# Patient Record
Sex: FEMALE | Race: BLACK OR AFRICAN AMERICAN | URBAN - METROPOLITAN AREA
[De-identification: names, ages, dates, MRNs, and addresses within clinical notes are randomized per-mention and may not be internally consistent; named-entity substitution may affect disease eponyms.]

---

## 2019-09-12 NOTE — PATIENT DISCUSSION
"Greater than 50% of exam spent in face to face dialogue with Mrs. Emiliano Pichardo and her . I feel that the reason she is having difficulty is due to the dense membranes and strands in her vitreous. Her vitreous is very ""smoky"". I have explained in detail that these can be removed surgically if she gets to the point where she is bothered by them. I will see her again in 2 months for re-evaluation. "

## 2022-07-18 ENCOUNTER — NEW PATIENT (OUTPATIENT)
Dept: URBAN - METROPOLITAN AREA CLINIC 1 | Facility: CLINIC | Age: 82
End: 2022-07-18

## 2022-07-18 DIAGNOSIS — H43.813: ICD-10-CM

## 2022-07-18 DIAGNOSIS — H25.813: ICD-10-CM

## 2022-07-18 DIAGNOSIS — H04.123: ICD-10-CM

## 2022-07-18 PROCEDURE — 92004 COMPRE OPH EXAM NEW PT 1/>: CPT

## 2022-07-18 PROCEDURE — 92015 DETERMINE REFRACTIVE STATE: CPT

## 2022-07-18 ASSESSMENT — VISUAL ACUITY
OD_BAT: 20/80
OD_CC: 20/40 -1
OS_BAT: 20/100
OS_CC: 20/40
OD_CC: J2
OS_CC: J2
OS_SC: 20/60 -1
OD_SC: 20/60

## 2022-07-18 ASSESSMENT — TONOMETRY
OD_IOP_MMHG: 22
OS_IOP_MMHG: 28

## 2022-09-12 ENCOUNTER — PRE-OP/H&P (OUTPATIENT)
Dept: URBAN - METROPOLITAN AREA CLINIC 1 | Facility: CLINIC | Age: 82
End: 2022-09-12

## 2022-09-12 VITALS
HEART RATE: 68 BPM | SYSTOLIC BLOOD PRESSURE: 132 MMHG | HEIGHT: 67 IN | BODY MASS INDEX: 23.23 KG/M2 | DIASTOLIC BLOOD PRESSURE: 84 MMHG | WEIGHT: 148 LBS

## 2022-09-12 DIAGNOSIS — H25.813: ICD-10-CM

## 2022-09-12 PROCEDURE — 92136 OPHTHALMIC BIOMETRY: CPT

## 2022-09-12 PROCEDURE — 92025 CPTRIZED CORNEAL TOPOGRAPHY: CPT

## 2022-09-12 PROCEDURE — 99499 UNLISTED E&M SERVICE: CPT

## 2022-09-12 ASSESSMENT — VISUAL ACUITY
OS_CC: 20/40
OD_CC: J2
OD_BAT: 20/80
OD_CC: 20/40
OS_BAT: 20/100
OS_CC: J2

## 2022-09-12 ASSESSMENT — KERATOMETRY
OS_AXISANGLE_DEGREES: 79
OD_AXISANGLE2_DEGREES: 177
OD_AXISANGLE_DEGREES: 087
OS_AXISANGLE2_DEGREES: 169
OD_K1POWER_DIOPTERS: 43.50
OS_K1POWER_DIOPTERS: 43.50
OS_K2POWER_DIOPTERS: 44.75
OD_K2POWER_DIOPTERS: 45.00

## 2022-09-12 ASSESSMENT — TONOMETRY
OS_IOP_MMHG: 26
OD_IOP_MMHG: 22

## 2022-09-21 ENCOUNTER — SURGERY/PROCEDURE (OUTPATIENT)
Dept: URBAN - METROPOLITAN AREA SURGERY 2 | Facility: SURGERY | Age: 82
End: 2022-09-21

## 2022-09-21 DIAGNOSIS — H25.812: ICD-10-CM

## 2022-09-21 PROBLEM — Z96.1: Noted: 2022-09-21

## 2022-09-21 PROCEDURE — 66984 XCAPSL CTRC RMVL W/O ECP: CPT

## 2022-09-21 ASSESSMENT — KERATOMETRY
OD_K1POWER_DIOPTERS: 43.50
OS_K2POWER_DIOPTERS: 44.75
OD_AXISANGLE_DEGREES: 087
OS_AXISANGLE_DEGREES: 79
OD_AXISANGLE2_DEGREES: 177
OD_K2POWER_DIOPTERS: 45.00
OS_K1POWER_DIOPTERS: 43.50
OS_AXISANGLE2_DEGREES: 169

## 2022-09-22 ENCOUNTER — POST-OP (OUTPATIENT)
Dept: URBAN - METROPOLITAN AREA CLINIC 1 | Facility: CLINIC | Age: 82
End: 2022-09-22

## 2022-09-22 DIAGNOSIS — Z96.1: ICD-10-CM

## 2022-09-22 PROCEDURE — 99024 POSTOP FOLLOW-UP VISIT: CPT

## 2022-09-22 ASSESSMENT — KERATOMETRY
OS_AXISANGLE_DEGREES: 79
OD_AXISANGLE2_DEGREES: 177
OD_K2POWER_DIOPTERS: 45.00
OS_K2POWER_DIOPTERS: 44.75
OD_AXISANGLE_DEGREES: 087
OD_K1POWER_DIOPTERS: 43.50
OS_K1POWER_DIOPTERS: 43.50
OS_AXISANGLE2_DEGREES: 169

## 2022-09-22 ASSESSMENT — TONOMETRY: OS_IOP_MMHG: 21

## 2022-09-22 ASSESSMENT — VISUAL ACUITY
OS_SC: 20/80
OS_PH: 20/30

## 2022-10-17 ENCOUNTER — POST-OP (OUTPATIENT)
Dept: URBAN - METROPOLITAN AREA CLINIC 1 | Facility: CLINIC | Age: 82
End: 2022-10-17

## 2022-10-17 DIAGNOSIS — Z96.1: ICD-10-CM

## 2022-10-17 PROCEDURE — 99024 POSTOP FOLLOW-UP VISIT: CPT

## 2022-10-17 ASSESSMENT — KERATOMETRY
OS_K2POWER_DIOPTERS: 44.75
OD_K1POWER_DIOPTERS: 43.50
OD_AXISANGLE_DEGREES: 087
OS_AXISANGLE_DEGREES: 79
OD_AXISANGLE2_DEGREES: 177
OD_K2POWER_DIOPTERS: 45.00
OS_K1POWER_DIOPTERS: 43.50
OS_AXISANGLE2_DEGREES: 169

## 2022-10-17 ASSESSMENT — VISUAL ACUITY: OS_SC: 20/70

## 2022-10-17 ASSESSMENT — TONOMETRY: OS_IOP_MMHG: 31

## 2023-04-10 ENCOUNTER — FOLLOW UP (OUTPATIENT)
Dept: URBAN - METROPOLITAN AREA CLINIC 1 | Facility: CLINIC | Age: 83
End: 2023-04-10

## 2023-04-10 DIAGNOSIS — H34.8121: ICD-10-CM

## 2023-04-10 DIAGNOSIS — Z96.1: ICD-10-CM

## 2023-04-10 PROCEDURE — 99213 OFFICE O/P EST LOW 20 MIN: CPT

## 2023-04-10 PROCEDURE — 92083 EXTENDED VISUAL FIELD XM: CPT

## 2023-04-10 ASSESSMENT — TONOMETRY
OD_IOP_MMHG: 16
OS_IOP_MMHG: 17

## 2023-04-10 ASSESSMENT — KERATOMETRY
OS_K1POWER_DIOPTERS: 43.50
OD_K2POWER_DIOPTERS: 45.00
OD_AXISANGLE_DEGREES: 087
OS_AXISANGLE_DEGREES: 79
OS_AXISANGLE2_DEGREES: 169
OD_AXISANGLE2_DEGREES: 177
OS_K2POWER_DIOPTERS: 44.75
OD_K1POWER_DIOPTERS: 43.50

## 2023-04-10 ASSESSMENT — VISUAL ACUITY
OD_CC: 20/25
OD_SC: 20/50+2
OS_SC: 20/40+1
OU_CC: 20/20-1
OU_SC: 20/30-2
OS_CC: 20/20-1

## 2023-09-01 ENCOUNTER — EMERGENCY VISIT (OUTPATIENT)
Dept: URBAN - METROPOLITAN AREA CLINIC 1 | Facility: CLINIC | Age: 83
End: 2023-09-01

## 2023-09-01 DIAGNOSIS — H43.813: ICD-10-CM

## 2023-09-01 DIAGNOSIS — H26.492: ICD-10-CM

## 2023-09-01 PROCEDURE — 92015 DETERMINE REFRACTIVE STATE: CPT

## 2023-09-01 PROCEDURE — 99213 OFFICE O/P EST LOW 20 MIN: CPT

## 2023-09-01 ASSESSMENT — VISUAL ACUITY
OS_BAT: 20/100
OD_CC: J1
OS_CC: J1
OS_CC: 20/25
OD_CC: 20/25-1

## 2023-09-01 ASSESSMENT — TONOMETRY
OS_IOP_MMHG: 24
OD_IOP_MMHG: 22

## 2023-09-08 ENCOUNTER — CLINIC PROCEDURE ONLY (OUTPATIENT)
Dept: URBAN - METROPOLITAN AREA CLINIC 1 | Facility: CLINIC | Age: 83
End: 2023-09-08

## 2023-09-08 DIAGNOSIS — Z96.1: ICD-10-CM

## 2023-09-08 DIAGNOSIS — H26.492: ICD-10-CM

## 2023-09-08 PROCEDURE — 66821 AFTER CATARACT LASER SURGERY: CPT

## 2023-10-02 ENCOUNTER — POST-OP (OUTPATIENT)
Dept: URBAN - METROPOLITAN AREA CLINIC 1 | Facility: CLINIC | Age: 83
End: 2023-10-02

## 2023-10-02 DIAGNOSIS — Z96.1: ICD-10-CM

## 2023-10-02 PROCEDURE — 99024 POSTOP FOLLOW-UP VISIT: CPT

## 2023-10-02 ASSESSMENT — VISUAL ACUITY
OD_CC: 20/25
OS_CC: 20/20

## 2023-10-02 ASSESSMENT — TONOMETRY
OS_IOP_MMHG: 16
OD_IOP_MMHG: 16

## 2024-07-16 ENCOUNTER — COMPREHENSIVE EXAM (OUTPATIENT)
Dept: URBAN - METROPOLITAN AREA CLINIC 1 | Facility: CLINIC | Age: 84
End: 2024-07-16

## 2024-07-16 DIAGNOSIS — H34.8121: ICD-10-CM

## 2024-07-16 DIAGNOSIS — H04.123: ICD-10-CM

## 2024-07-16 DIAGNOSIS — H16.143: ICD-10-CM

## 2024-07-16 DIAGNOSIS — H40.053: ICD-10-CM

## 2024-07-16 DIAGNOSIS — H25.811: ICD-10-CM

## 2024-07-16 DIAGNOSIS — Z96.1: ICD-10-CM

## 2024-07-16 DIAGNOSIS — H43.813: ICD-10-CM

## 2024-07-16 PROCEDURE — 99214 OFFICE O/P EST MOD 30 MIN: CPT

## 2024-07-16 ASSESSMENT — TONOMETRY
OD_IOP_MMHG: 13
OS_IOP_MMHG: 23

## 2024-07-16 ASSESSMENT — VISUAL ACUITY
OD_BAT: 20/30
OS_CC: 20/25+1
OD_CC: 20/25-2

## 2024-10-08 ENCOUNTER — EMERGENCY VISIT (OUTPATIENT)
Dept: URBAN - METROPOLITAN AREA CLINIC 1 | Facility: CLINIC | Age: 84
End: 2024-10-08

## 2024-10-08 DIAGNOSIS — H34.8121: ICD-10-CM

## 2024-10-08 DIAGNOSIS — H43.813: ICD-10-CM

## 2024-10-08 DIAGNOSIS — H25.811: ICD-10-CM

## 2024-10-08 DIAGNOSIS — H40.053: ICD-10-CM

## 2024-10-08 DIAGNOSIS — H04.123: ICD-10-CM

## 2024-10-08 DIAGNOSIS — H34.232: ICD-10-CM

## 2024-10-08 DIAGNOSIS — H16.143: ICD-10-CM

## 2024-10-08 PROCEDURE — 92015 DETERMINE REFRACTIVE STATE: CPT

## 2024-10-08 PROCEDURE — 99213 OFFICE O/P EST LOW 20 MIN: CPT

## 2024-10-15 ENCOUNTER — PRE-OP/H&P (OUTPATIENT)
Dept: URBAN - METROPOLITAN AREA CLINIC 1 | Facility: CLINIC | Age: 84
End: 2024-10-15

## 2024-10-15 VITALS
SYSTOLIC BLOOD PRESSURE: 147 MMHG | DIASTOLIC BLOOD PRESSURE: 83 MMHG | HEART RATE: 64 BPM | BODY MASS INDEX: 24.59 KG/M2 | WEIGHT: 144 LBS | HEIGHT: 64 IN

## 2024-10-15 DIAGNOSIS — H25.811: ICD-10-CM

## 2024-10-15 PROCEDURE — 92136 OPHTHALMIC BIOMETRY: CPT

## 2024-10-15 PROCEDURE — 92025 CPTRIZED CORNEAL TOPOGRAPHY: CPT | Mod: NC

## 2024-10-15 PROCEDURE — 99499 UNLISTED E&M SERVICE: CPT

## 2024-10-23 ENCOUNTER — SURGERY/PROCEDURE (OUTPATIENT)
Dept: URBAN - METROPOLITAN AREA SURGERY 1 | Facility: SURGERY | Age: 84
End: 2024-10-23

## 2024-10-23 DIAGNOSIS — H25.811: ICD-10-CM

## 2024-10-23 PROCEDURE — 66984 XCAPSL CTRC RMVL W/O ECP: CPT

## 2024-10-24 ENCOUNTER — POST-OP (OUTPATIENT)
Dept: URBAN - METROPOLITAN AREA CLINIC 1 | Facility: CLINIC | Age: 84
End: 2024-10-24

## 2024-10-24 DIAGNOSIS — Z96.1: ICD-10-CM

## 2024-11-15 ENCOUNTER — POST-OP (OUTPATIENT)
Dept: URBAN - METROPOLITAN AREA CLINIC 1 | Facility: CLINIC | Age: 84
End: 2024-11-15

## 2024-11-15 DIAGNOSIS — Z96.1: ICD-10-CM

## 2025-02-18 ENCOUNTER — EMERGENCY VISIT (OUTPATIENT)
Age: 85
End: 2025-02-18

## 2025-02-18 DIAGNOSIS — H40.1124: ICD-10-CM

## 2025-02-18 PROCEDURE — 99213 OFFICE O/P EST LOW 20 MIN: CPT

## 2025-05-19 ENCOUNTER — FOLLOW UP (OUTPATIENT)
Age: 85
End: 2025-05-19

## 2025-05-19 DIAGNOSIS — H40.1124: ICD-10-CM

## 2025-05-19 DIAGNOSIS — H40.053: ICD-10-CM

## 2025-05-19 PROCEDURE — 99213 OFFICE O/P EST LOW 20 MIN: CPT

## 2025-05-23 ENCOUNTER — FOLLOW UP (OUTPATIENT)
Age: 85
End: 2025-05-23

## 2025-05-23 DIAGNOSIS — H40.1132: ICD-10-CM

## 2025-05-23 PROCEDURE — 99213 OFFICE O/P EST LOW 20 MIN: CPT

## 2025-05-23 PROCEDURE — 92133 CPTRZD OPH DX IMG PST SGM ON: CPT
